# Patient Record
(demographics unavailable — no encounter records)

---

## 2024-10-17 NOTE — PHYSICAL EXAM
[Chaperone Present] : A chaperone was present in the examining room during all aspects of the physical examination [43520] : A chaperone was present during the pelvic exam. [FreeTextEntry2] : LISA Mckeon  [Appropriately responsive] : appropriately responsive [Alert] : alert [No Acute Distress] : no acute distress [Soft] : soft [Non-tender] : non-tender [Non-distended] : non-distended [No HSM] : No HSM [No Lesions] : no lesions [No Mass] : no mass [Oriented x3] : oriented x3 [FreeTextEntry7] : Pfannensteil scar well-healed [Labia Majora] : normal [Labia Minora] : normal [Normal] : normal [Tenderness] : nontender [Enlarged ___ wks] : not enlarged [Uterine Adnexae] : normal

## 2024-10-17 NOTE — PHYSICAL EXAM
[Chaperone Present] : A chaperone was present in the examining room during all aspects of the physical examination [77780] : A chaperone was present during the pelvic exam. [FreeTextEntry2] : LISA Mckeon  [Appropriately responsive] : appropriately responsive [Alert] : alert [No Acute Distress] : no acute distress [Soft] : soft [Non-tender] : non-tender [Non-distended] : non-distended [No HSM] : No HSM [No Lesions] : no lesions [No Mass] : no mass [Oriented x3] : oriented x3 [FreeTextEntry7] : Pfannensteil scar well-healed [Labia Majora] : normal [Labia Minora] : normal [Normal] : normal [Tenderness] : nontender [Enlarged ___ wks] : not enlarged [Uterine Adnexae] : normal

## 2024-10-17 NOTE — HISTORY OF PRESENT ILLNESS
[FreeTextEntry1] : Pt is a 39 yo  referred from Beba Schulte NP, for bloating/ swelling and discomfort along caesarean section scar, last delivery was .  c/o constant bloating, had allergy testing which was normal. Prior GI evaluation with endoscopy was normal.   Monthly menses, lasting 3 days then stops for 1-2 days then returns for couple days. States first 3 days changing tampon every 30min to an hour. States worsening pain over last year, constant bloating, sometimes states her pelvis "becomes hard," unrelated to timing in cycle. Denies urinary symptoms. Occasional constipation, takes probiotics which helps. Denies dyspareunia. Denies dyschezia. Has loose stool during menses. Also with significant gas with bloating. Has not found any exacerbating or alleviating factors.   TVUS (10/08/2024) 6x6mm area of calcified shadowing noted in area of C section scar. Uterus: 8.8 x 4.24 x 5.09 cm Endometrium: 1.22 cm Rovary: 3.59 x 2.16 x 2.21 cm Lovary 2.25 x 1.55 x 1.59 cm   Lpap- 24, negative Lhpv- 24, negative Lmammo- (22) negative, BIRADS 1 Lcolonoscopy-never had   PObHx:  +c/s x2 (states second  had longer recovery, C/S  twins then C/S FT) PGynHx: Denies cysts, fibroids, abnormal pap, STI    PMH: Denies PSH: breast bx (), c/s x2 ( twins, ) FamHx: - Colon cancer, Maternal grandmother  -Lymphoma: Father SocialHx: No tobacco, rare ETOH, denies drug use Medications: Denies Allergies: PCN (childhood rash)

## 2024-10-17 NOTE — DISCUSSION/SUMMARY
[FreeTextEntry1] : 39 yo with AUB, dysmenorrhea, bloating with prior ultrasound findings of calcification near  scar.  -AUB labs, referral given -Discussed possible etiologies including isthomocele, adenomyosis, endometriosis, non-gyn etiologies including GI -Given inconclusive findings of calcification in area of  scar on sono, I recommend MRI Pelvis for further evaluation -RTO 1-2 wks after imaging for results and management discussion. We discussed she may benefit from GI evaluation as well.   I spent 50 minutes of total time on the day of the encounter preparing for the visit, review of records, interacting with the patient, EHR documentation, and coordinating care.

## 2024-10-17 NOTE — HISTORY OF PRESENT ILLNESS
[FreeTextEntry1] : Pt is a 37 yo  referred from Beba Schulte NP, for bloating/ swelling and discomfort along caesarean section scar, last delivery was .  c/o constant bloating, had allergy testing which was normal. Prior GI evaluation with endoscopy was normal.   Monthly menses, lasting 3 days then stops for 1-2 days then returns for couple days. States first 3 days changing tampon every 30min to an hour. States worsening pain over last year, constant bloating, sometimes states her pelvis "becomes hard," unrelated to timing in cycle. Denies urinary symptoms. Occasional constipation, takes probiotics which helps. Denies dyspareunia. Denies dyschezia. Has loose stool during menses. Also with significant gas with bloating. Has not found any exacerbating or alleviating factors.   TVUS (10/08/2024) 6x6mm area of calcified shadowing noted in area of C section scar. Uterus: 8.8 x 4.24 x 5.09 cm Endometrium: 1.22 cm Rovary: 3.59 x 2.16 x 2.21 cm Lovary 2.25 x 1.55 x 1.59 cm   Lpap- 24, negative Lhpv- 24, negative Lmammo- (22) negative, BIRADS 1 Lcolonoscopy-never had   PObHx:  +c/s x2 (states second  had longer recovery, C/S  twins then C/S FT) PGynHx: Denies cysts, fibroids, abnormal pap, STI    PMH: Denies PSH: breast bx (), c/s x2 ( twins, ) FamHx: - Colon cancer, Maternal grandmother  -Lymphoma: Father SocialHx: No tobacco, rare ETOH, denies drug use Medications: Denies Allergies: PCN (childhood rash)

## 2024-10-17 NOTE — CONSULT LETTER
[Dear  ___] : Dear ~ES, [FreeTextEntry1] : I had the pleasure of evaluating your patient, MERLINE TORIBIO. Please see my note enclosed for full details.   Thank you for allowing me to participate in the care of this patient. If you have any questions, please do not hesitate to contact me.   Sincerely,   Amaris Mejias MD, FACOG, [facility]  [address] [place] [address] Phone: [phone]  Fax: [phone]

## 2024-10-17 NOTE — DISCUSSION/SUMMARY
[FreeTextEntry1] : 37 yo with AUB, dysmenorrhea, bloating with prior ultrasound findings of calcification near  scar.  -AUB labs, referral given -Discussed possible etiologies including isthomocele, adenomyosis, endometriosis, non-gyn etiologies including GI -Given inconclusive findings of calcification in area of  scar on sono, I recommend MRI Pelvis for further evaluation -RTO 1-2 wks after imaging for results and management discussion. We discussed she may benefit from GI evaluation as well.   I spent 50 minutes of total time on the day of the encounter preparing for the visit, review of records, interacting with the patient, EHR documentation, and coordinating care.

## 2024-10-17 NOTE — CONSULT LETTER
[Dear  ___] : Dear ~ES, [FreeTextEntry1] : I had the pleasure of evaluating your patient, MERLINE TORIBIO. Please see my note enclosed for full details.   Thank you for allowing me to participate in the care of this patient. If you have any questions, please do not hesitate to contact me.   Sincerely,   Amaris Mejias MD, FACOG, [facility]  [address] [place] [address] Phone: [phone]  Fax: [phone]